# Patient Record
Sex: MALE | URBAN - METROPOLITAN AREA
[De-identification: names, ages, dates, MRNs, and addresses within clinical notes are randomized per-mention and may not be internally consistent; named-entity substitution may affect disease eponyms.]

---

## 2024-03-18 ENCOUNTER — LAB (OUTPATIENT)
Dept: URBAN - METROPOLITAN AREA CLINIC 12 | Facility: CLINIC | Age: 59
End: 2024-03-18

## 2024-03-18 ENCOUNTER — OV NP (OUTPATIENT)
Dept: URBAN - METROPOLITAN AREA CLINIC 12 | Facility: CLINIC | Age: 59
End: 2024-03-18
Payer: COMMERCIAL

## 2024-03-18 VITALS
HEART RATE: 54 BPM | WEIGHT: 173.6 LBS | DIASTOLIC BLOOD PRESSURE: 79 MMHG | HEIGHT: 70 IN | TEMPERATURE: 97.9 F | SYSTOLIC BLOOD PRESSURE: 128 MMHG | BODY MASS INDEX: 24.85 KG/M2

## 2024-03-18 DIAGNOSIS — R13.19 ESOPHAGEAL DYSPHAGIA: ICD-10-CM

## 2024-03-18 DIAGNOSIS — R09.89 THROAT CLEARING: ICD-10-CM

## 2024-03-18 PROCEDURE — 99203 OFFICE O/P NEW LOW 30 MIN: CPT | Performed by: STUDENT IN AN ORGANIZED HEALTH CARE EDUCATION/TRAINING PROGRAM

## 2024-03-18 NOTE — HPI-TODAY'S VISIT:
57 yo M here for evaluation of possible GERD.  Chronic throat clearing issue his entire life. Says it has been worsening as he has gotten older. Was treated for GERD years ago -- was given prilosec without much relief.  Did a swallow study he says that was normal.  He says it does feel like hes aspirating his food, at the bottom of his throat. He had nasal surgery for deviated suptum Says he does not have allergies He did a 72 hour fast a month ago, still had symptoms.  Says it feels like something is coming up, not going down.  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  Reports colonoscopy 6 years ago -- done in [place], repeat in 10 years

## 2024-04-12 ENCOUNTER — EGD (OUTPATIENT)
Dept: URBAN - METROPOLITAN AREA SURGERY CENTER 15 | Facility: SURGERY CENTER | Age: 59
End: 2024-04-12